# Patient Record
Sex: FEMALE | Race: WHITE
[De-identification: names, ages, dates, MRNs, and addresses within clinical notes are randomized per-mention and may not be internally consistent; named-entity substitution may affect disease eponyms.]

---

## 2017-02-26 ENCOUNTER — HOSPITAL ENCOUNTER (EMERGENCY)
Dept: HOSPITAL 59 - ER | Age: 47
Discharge: HOME | End: 2017-02-26
Payer: MEDICARE

## 2017-02-26 DIAGNOSIS — G43.109: Primary | ICD-10-CM

## 2017-02-26 PROCEDURE — 99283 EMERGENCY DEPT VISIT LOW MDM: CPT

## 2017-02-26 PROCEDURE — 96372 THER/PROPH/DIAG INJ SC/IM: CPT

## 2017-02-26 NOTE — EMERGENCY DEPARTMENT RECORD
History of Present Illness





- General


Chief Complaint: Headache Migraine


Stated Complaint: ha


Time Seen by Provider: 17 12:05


Source: Patient, RN notes reviewed


Mode of Arrival: Ambulatory





- History of Present Illness


Initial Comments: 


Patient is having her typical migraine and has nausea and she has been seen 

here for migraines before.





MD Complaint: "Migraine"


Onset/Timin


-: Days(s)


Onset Description: Gradual


Location: Diffuse, Frontal


Severity: Mild


Severity scale (1-10): 9


Quality: Aching


Consistency: Constant


Improves With: Nothing


Worsens With: Light, Noise


Treatments Prior to Arrival: Migraine medication





- Related Data


 Home Medications











 Medication  Instructions  Recorded  Confirmed  Last Taken


 


Citalopram Hydrobromide [Celexa] 20 mg PO DAILY 01/24/15 02/26/17 1 Day Ago


 


Calcium Citrate 950 mg PO DAILY 04/25/15 02/26/17 1 Day Ago


 


Cholecalciferol (Vitamin D3) 2,000 unit PO DAILY 04/25/15 02/26/17 1 Day Ago





[Vitamin D3]    


 


Cyanocobalamin (Vitamin B-12) 500 mcg SL DAILY 04/25/15 02/26/17 1 Day Ago





[B-12]    


 


Magnesium Oxide [Mag Ox] 400 mg PO DAILY 04/25/15 02/26/17 1 Day Ago


 


Tramadol HCl [Ultram] 50 mg PO Q6H 12/02/15 02/26/17 1 Day Ago


 


Oxymorphone HCl [Oxymorphone HCl 10 mg PO BID 17 2 Days Ago





ER]    


 


Pantoprazole Sodium [Protonix] 20 mg PO DAILY 17 1 Day Ago








 Previous Rx's











 Medication  Instructions  Recorded


 


Tramadol HCl 50 mg PO Q8H #10 tab 16











 Allergies











Allergy/AdvReac Type Severity Reaction Status Date / Time


 


hydromorphone [HYDROMORPHONE] Allergy Unknown DIFFICULTY Verified 17 12:01





   BREATHING  


 


morphine [MORPHINE] Allergy Unknown DIFFICULTY Verified 17 12:01





   BREATHING  


 


Penicillins [PENICILLINS] Allergy Unknown RASH Verified 17 12:01


 


codeine [CODEINE] AdvReac Unknown NAUSEA AND Verified 17 12:01





   VOMITING  


 


NSAIDS (Non-Steroidal AdvReac  VOMITING Verified 17 12:01





Anti-Inflamma     














Travel Screening





- Travel/Exposure Within Last 30 Days


Have you traveled within the last 30 days?: No





- Travel/Exposure Within Last Year


Have you traveled outside the [place] in the last year?: No





- Additonal Travel Details


Have you been exposed to anyone with a communicable illness?: No





- Travel Symptoms


Symptom Screening: None





Review of Systems


Reviewed: No additional complaints except as noted below


Constitutional: Reports: As per HPI.  Denies: Chills, Fever, Malaise, Night 

sweats, Weakness, Weight change


Eyes: Reports: As per HPI.  Denies: Eye discharge, Eye pain, Photophobia, 

Vision change


ENT: Reports: As per HPI.  Denies: Congestion, Dental pain, Ear pain, Epistaxis

, Hearing loss, Throat pain


Respiratory: Reports: As per HPI.  Denies: Cough, Dyspnea, Hemoptysis, Stridor, 

Wheezes


Cardiovascular: Reports: As per HPI.  Denies: Arrhythmia, Chest pain, Dyspnea 

on exertion, Edema, Murmurs, Orthopnea, Palpitations, Paroxysmal nocturnal 

dyspnea, Rheumatic Fever, Syncope


Endocrine: Reports: As per HPI.  Denies: Fatigue, Heat or cold intolerance, 

Polydipsia, Polyuria


Gastrointestinal: Reports: As per HPI.  Denies: Abdominal pain, Constipation, 

Diarrhea, Hematemesis, Hematochezia, Melena, Nausea, Vomiting


Genitourinary: Reports: As per HPI.  Denies: Abnormal menses, Discharge, 

Dyspareunia, Dysuria, Frequency, Hematuria, Incontinence, Retention, Urgency


Musculoskeletal: Reports: As per HPI.  Denies: Arthralgia, Back pain, Gout, 

Joint swelling, Myalgia, Neck pain


Skin: Reports: As per HPI.  Denies: Bruising, Change in color, Change in hair/

nails, Lesions, Pruritus, Rash


Neurological: Reports: As per HPI, Headache.  Denies: Abnormal gait, Confusion, 

Numbness, Paresthesias, Seizure, Tingling, Tremors, Vertigo, Weakness


Psychiatric: Reports: As per HPI.  Denies: Anxiety, Auditory hallucinations, 

Depression, Homicidal thoughts, Suicidal thoughts, Visual hallucinations


Hematological/Lymphatic: Reports: As per HPI.  Denies: Anemia, Blood Clots, 

Easy bleeding, Easy bruising, Swollen glands





Past Medical History





- SOCIAL HISTORY


Smoking Status: Former smoker


Alcohol Use: None


Drug Use: None





- RESPIRATORY


Hx Respiratory Disorders: Yes


Hx Asthma: Yes (controlled with inhalers)





- CARDIOVASCULAR


Hx Cardio Disorders: No


Hx Hypertension: No (denies)


Comment:: troubles with knee





- NEURO


Hx Neuro Disorders: Yes


Hx Headaches: Yes


Hx of Migraines: Yes (2-3x per week)


Comment:: oxycontin for relief





- GI


Hx GI Disorders: Yes


Hx Reflux: Yes (controlled with meds)


Hx Wt Loss/Wt Gain: Yes (appeitite decreased)


Comment:: gastric bypass  with 125# wt loss





- 


Hx Genitourinary Disorders: Yes


Hx Kidney Stones: Yes





- ENDOCRINE


Hx Endocrine Disorders: No


Hx Diabetes: Yes (gestational diabetes)





- MUSCULOSKELETAL


Hx Musculoskeletal Disorders: Yes


Hx Arthritis: Yes


Hx Osteoporosis: Yes





- PSYCH


Hx Psych Problems: Yes


Hx Depression: Yes





- HEMATOLOGY/ONCOLOGY


Hx Hematology/Oncology Disorders: No





Family Medical History


Any Significant Family History?: Yes


Hx Diabetes: Father, Mother


Hx Heart Disease: Father, Mother


Hx HTN: Father


Hx Kidney Disease: Father, Mother


Hx Resp Disorders: Father, Mother





Physical Exam





- General


General Appearance: Alert, Oriented x3, Cooperative, No acute distress





- Head


Head exam: Normal inspection





- Eye


Eye exam: Normal appearance, PERRL


Pupils: Normal accommodation





- ENT


ENT exam: Normal exam, Mucous membranes moist, Normal external ear exam, Normal 

orophraynx, TM's normal bilaterally


Ear exam: Normal external inspection.  negative: External canal tenderness


Nasal Exam: Normal inspection.  negative: Discharge, Sinus tenderness


Mouth exam: Normal external inspection, Tongue normal


Teeth exam: Normal inspection.  negative: Dental caries


Throat exam: Normal inspection.  negative: Tonsillar erythema, Tonsillar exudate





- Neck


Neck exam: Normal inspection, Full ROM.  negative: Tenderness





- Respiratory


Respiratory exam: Normal lung sounds bilaterally.  negative: Respiratory 

distress





- Cardiovascular


Cardiovascular Exam: Regular rate, Normal rhythm, Normal heart sounds





- GI/Abdominal


GI/Abdominal exam: Soft, Normal bowel sounds.  negative: Tenderness





- Rectal


Rectal exam: Deferred





- 


 exam: Deferred





- Extremities


Extremities exam: Normal inspection, Full ROM, Normal capillary refill.  

negative: Tenderness





- Back


Back exam: Reports: Normal inspection, Full ROM.  Denies: Muscle spasm, Rash 

noted, Tenderness





- Neurological


Neurological exam: Alert, Normal gait, Oriented X3, Reflexes normal





- Psychiatric


Psychiatric exam: Normal affect, Normal mood





- Skin


Skin exam: Dry, Intact, Normal color, Warm





Course





 Vital Signs











  17





  11:54


 


Temperature 97.9 F


 


Pulse Rate 84


 


Respiratory 16





Rate 


 


Blood Pressure 143/92


 


Pulse Ox 97














- Reevaluation(s)


Reevaluation #1: 


feeling better.





17 12:40








Disposition


Clinical Impression: 


Migraine


Qualifiers:


 Migraine type: with aura Status migrainosus presence: without status 

migrainosus Intractability: not intractable Qualified Code(s): G43.109 - 

Migraine with aura, not intractable, without status migrainosus


Disposition: Home, Self-Care


Condition: (1) Good


Instructions:  Migraine Headache (ED)


Additional Instructions: 


follow up with gonsalo Anthony in 3 days


Forms:  Patient Portal Access


Time of Disposition: 12:42

## 2017-05-07 ENCOUNTER — HOSPITAL ENCOUNTER (EMERGENCY)
Dept: HOSPITAL 59 - ER | Age: 47
Discharge: HOME | End: 2017-05-07
Payer: MEDICAID

## 2017-05-07 DIAGNOSIS — H53.149: ICD-10-CM

## 2017-05-07 DIAGNOSIS — G43.909: Primary | ICD-10-CM

## 2017-05-07 DIAGNOSIS — R11.0: ICD-10-CM

## 2017-05-07 PROCEDURE — 96372 THER/PROPH/DIAG INJ SC/IM: CPT

## 2017-05-07 PROCEDURE — 99283 EMERGENCY DEPT VISIT LOW MDM: CPT

## 2017-05-07 NOTE — EMERGENCY DEPARTMENT RECORD
History of Present Illness





- General


Chief Complaint: Headache Migraine


Stated Complaint: HA


Time Seen by Provider: 17 16:25


Source: Patient


Mode of Arrival: Ambulatory


Limitations: No limitations





- History of Present Illness


MD Complaint: "Migraine"


Onset/Timin


-: Hour(s)


Onset Description: Gradual


Location: Diffuse


Severity: Severe


Severity scale (1-10): 10


Quality: Throbbing, Similar to previous headaches


Consistency: Constant


Improves With: Nothing


Worsens With: Light, Noise


Context: Occured at rest


Associated Symptoms: Nausea, Photophobia, Sensitivity to sound


Treatments Prior to Arrival: Migraine medication





- Related Data


 Home Medications











 Medication  Instructions  Recorded  Confirmed  Last Taken


 


Citalopram Hydrobromide [Celexa] 20 mg PO DAILY 01/24/15 05/07/17 05/07/17


 


Calcium Citrate 950 mg PO DAILY 04/25/15 05/07/17 05/07/17


 


Cholecalciferol (Vitamin D3) 2,000 unit PO DAILY 04/25/15 05/07/17 05/07/17





[Vitamin D3]    


 


Cyanocobalamin (Vitamin B-12) 500 mcg SL DAILY 04/25/15 05/07/17 05/07/17





[B-12]    


 


Magnesium Oxide [Mag Ox] 400 mg PO DAILY 04/25/15 05/07/17 05/07/17


 


Tramadol HCl [Ultram] 50 mg PO Q6H 12/02/15 05/07/17 05/07/17


 


Oxymorphone HCl [Oxymorphone HCl 10 mg PO BID 17





ER]    


 


Pantoprazole Sodium [Protonix] 20 mg PO DAILY 17











 Allergies











Allergy/AdvReac Type Severity Reaction Status Date / Time


 


hydromorphone [HYDROMORPHONE] Allergy Unknown DIFFICULTY Verified 17 16:22





   BREATHING  


 


morphine [MORPHINE] Allergy Unknown DIFFICULTY Verified 17 16:22





   BREATHING  


 


Penicillins [PENICILLINS] Allergy Unknown RASH Verified 17 16:22


 


codeine [CODEINE] AdvReac Unknown NAUSEA AND Verified 17 16:22





   VOMITING  


 


NSAIDS (Non-Steroidal AdvReac  VOMITING Verified 17 16:22





Anti-Inflamma     














Travel Screening





- Travel/Exposure Within Last 30 Days


Have you traveled within the last 30 days?: No





- Travel/Exposure Within Last Year


Have you traveled outside the [place] in the last year?: No





- Additonal Travel Details


Have you been exposed to anyone with a communicable illness?: No





- Travel Symptoms


Symptom Screening: None





Review of Systems


Reviewed: No additional complaints except as noted below


Constitutional: Reports: As per HPI.  Denies: Chills, Fever, Malaise, Night 

sweats, Weakness, Weight change


Eyes: Reports: As per HPI.  Denies: Eye discharge, Eye pain, Photophobia, 

Vision change


ENT: Reports: As per HPI.  Denies: Congestion, Dental pain, Ear pain, Epistaxis

, Hearing loss, Throat pain


Respiratory: Reports: As per HPI.  Denies: Cough, Dyspnea, Hemoptysis, Stridor, 

Wheezes


Cardiovascular: Reports: As per HPI.  Denies: Arrhythmia, Chest pain, Dyspnea 

on exertion, Edema, Murmurs, Orthopnea, Palpitations, Paroxysmal nocturnal 

dyspnea, Rheumatic Fever, Syncope


Endocrine: Reports: As per HPI.  Denies: Fatigue, Heat or cold intolerance, 

Polydipsia, Polyuria


Gastrointestinal: Reports: As per HPI.  Denies: Abdominal pain, Constipation, 

Diarrhea, Hematemesis, Hematochezia, Melena, Nausea, Vomiting


Genitourinary: Reports: As per HPI.  Denies: Abnormal menses, Discharge, 

Dyspareunia, Dysuria, Frequency, Hematuria, Incontinence, Retention, Urgency


Musculoskeletal: Reports: As per HPI.  Denies: Arthralgia, Back pain, Gout, 

Joint swelling, Myalgia, Neck pain


Skin: Reports: As per HPI.  Denies: Bruising, Change in color, Change in hair/

nails, Lesions, Pruritus, Rash


Neurological: Reports: As per HPI.  Denies: Abnormal gait, Confusion, Headache, 

Numbness, Paresthesias, Seizure, Tingling, Tremors, Vertigo, Weakness


Psychiatric: Reports: As per HPI.  Denies: Anxiety, Auditory hallucinations, 

Depression, Homicidal thoughts, Suicidal thoughts, Visual hallucinations


Hematological/Lymphatic: Reports: As per HPI.  Denies: Anemia, Blood Clots, 

Easy bleeding, Easy bruising, Swollen glands





Past Medical History





- SOCIAL HISTORY


Smoking Status: Former smoker


Alcohol Use: None


Drug Use: None





- RESPIRATORY


Hx Respiratory Disorders: Yes


Hx Asthma: Yes (controlled with inhalers)





- CARDIOVASCULAR


Hx Cardio Disorders: No


Hx Hypertension: No (denies)


Comment:: troubles with knee





- NEURO


Hx Neuro Disorders: Yes


Hx Headaches: Yes


Hx of Migraines: Yes (2-3x per week)


Comment:: oxycontin for relief





- GI


Hx GI Disorders: Yes


Hx Reflux: Yes (controlled with meds)


Hx Wt Loss/Wt Gain: Yes (appeitite decreased)


Comment:: gastric bypass  with 125# wt loss





- 


Hx Genitourinary Disorders: Yes


Hx Kidney Stones: Yes





- ENDOCRINE


Hx Endocrine Disorders: No


Hx Diabetes: Yes (gestational diabetes)





- MUSCULOSKELETAL


Hx Musculoskeletal Disorders: Yes


Hx Arthritis: Yes


Hx Osteoporosis: Yes





- PSYCH


Hx Psych Problems: Yes


Hx Depression: Yes





- HEMATOLOGY/ONCOLOGY


Hx Hematology/Oncology Disorders: No





Family Medical History


Any Significant Family History?: Yes


Hx Diabetes: Father, Mother


Hx Heart Disease: Father, Mother


Hx HTN: Father


Hx Kidney Disease: Father, Mother


Hx Resp Disorders: Father, Mother





Physical Exam





- General


General Appearance: Alert, Oriented x3, Cooperative, Mild distress





- Head


Head exam: Normal inspection





- Eye


Eye exam: Normal appearance, PERRL, EOMI


Pupils: Normal accommodation





- ENT


ENT exam: Normal exam, Mucous membranes moist, Normal external ear exam, Normal 

orophraynx, TM's normal bilaterally


Ear exam: Normal external inspection.  negative: External canal tenderness


Nasal Exam: Normal inspection.  negative: Discharge, Sinus tenderness


Mouth exam: Normal external inspection, Tongue normal


Teeth exam: Normal inspection.  negative: Dental caries


Throat exam: Normal inspection.  negative: Tonsillar erythema, Tonsillar exudate





- Neck


Neck exam: Normal inspection, Full ROM.  negative: Tenderness





- Respiratory


Respiratory exam: Normal lung sounds bilaterally.  negative: Respiratory 

distress





- Cardiovascular


Cardiovascular Exam: Regular rate, Normal rhythm, Normal heart sounds





- GI/Abdominal


GI/Abdominal exam: Soft, Normal bowel sounds.  negative: Tenderness





- Rectal


Rectal exam: Deferred





- 


 exam: Deferred





- Extremities


Extremities exam: Normal inspection, Full ROM, Normal capillary refill.  

negative: Tenderness





- Back


Back exam: Reports: Normal inspection, Full ROM.  Denies: Muscle spasm, Rash 

noted, Tenderness





- Neurological


Neurological exam: Alert, CN II-XII intact, Normal gait, Oriented X3





- Psychiatric


Psychiatric exam: Normal affect, Normal mood





- Skin


Skin exam: Dry, Intact, Normal color, Warm





Course





 Vital Signs











  17





  16:10


 


Temperature 98.6 F


 


Pulse Rate 86


 


Respiratory 16





Rate 


 


Blood Pressure 134/79


 


Pulse Ox 97














Disposition


Disposition: Discharge


Clinical Impression: 


Migraine


Qualifiers:


 Migraine type: unspecified Status migrainosus presence: without status 

migrainosus Intractability: not intractable Qualified Code(s): G43.909 - 

Migraine, unspecified, not intractable, without status migrainosus





Disposition: Home, Self-Care


Condition: (1) Good


Instructions:  Migraine Headache (ED)


Additional Instructions: 


follow up with neurologist.  return sooner if worse


Forms:  Patient Portal Access

## 2017-05-25 ENCOUNTER — HOSPITAL ENCOUNTER (EMERGENCY)
Dept: HOSPITAL 59 - ER | Age: 47
Discharge: LEFT BEFORE DISCHARGE | End: 2017-05-25
Payer: MEDICARE

## 2017-05-25 DIAGNOSIS — Z53.20: Primary | ICD-10-CM

## 2017-12-08 ENCOUNTER — HOSPITAL ENCOUNTER (OUTPATIENT)
Dept: HOSPITAL 59 - SUR | Age: 47
Discharge: HOME | End: 2017-12-08
Attending: PAIN MEDICINE
Payer: MEDICARE

## 2017-12-08 DIAGNOSIS — T85.193A: Primary | ICD-10-CM

## 2017-12-08 DIAGNOSIS — M54.17: ICD-10-CM

## 2017-12-08 DIAGNOSIS — T85.192A: ICD-10-CM

## 2017-12-08 DIAGNOSIS — E66.9: ICD-10-CM

## 2017-12-08 DIAGNOSIS — Z98.84: ICD-10-CM

## 2017-12-08 DIAGNOSIS — M54.16: ICD-10-CM

## 2017-12-08 DIAGNOSIS — J45.909: ICD-10-CM

## 2017-12-08 PROCEDURE — 00300 ANES ALL PX INTEG H/N/PTRUNK: CPT

## 2017-12-08 PROCEDURE — 63688 REV/RMV IMP SP NPG/R DTCH CN: CPT

## 2017-12-08 PROCEDURE — 63661 REMOVE SPINE ELTRD PERQ ARAY: CPT

## 2017-12-08 NOTE — HISTORY AND PHYSICAL - FERRO
CHIEF COMPLAINT/HISTORY OF CHIEF COMPLAINT:  This patient with a history of an 
intractable radiculitis has a spinal cord stimulator in place since 4/06/16.  
Although this appeared to be working quite well for the patient over the last 
number of months the system has stopped covering her pain and she has stopped 
using the system.  Due to the failure of the therapy she is here by her request 
for removal of the system.  



PAST MEDICAL HISTORY:  Asthmatic bronchitis.  



PAST SURGICAL HISTORY:  Knee surgery, tonsils, ankle surgery, and gallbladder 
surgery.



MEDICATIONS ON ADMISSION:  List to be provided.  



ALLERGIES:  VICODIN.  



FAMILY/PSYCHOSOCIAL HISTORY:  Social history - Positive for caffeine.  Family 
history - Positive for asthma, diabetes, coronary artery disease, and 
hypertension.  



SYSTEMS REVIEW:  The patient seems appropriate in no acute distress.  The 
remainder of the systems review is positive for glasses, breathing difficulties
, degenerative arthritis, peripheral edema, depression, and difficulty 
sleeping.  



PHYSICAL EXAMINATION:  Height and weight are not known.  Vital signs are not 
available.  

HEENT:  Within normal limits.  

LUNGS:  Clear.

HEART:  Regular rate and rhythm.  

ABDOMEN:  Nontender.  

MUSCULOSKELETAL:  Examination of the musculoskeletal system shows the 
incisional sites of the leads approximating T12.  Generator site is noted at 
the left posterior gluteal margin.  All of the incisions are intact.  Chronic 
pain pattern low back with a bilateral lower extremity extension.  No obvious 
motor and sensory field abnormalities. 

NEUROLOGIC:  Cranial nerves are intact.  



IMPRESSION:  

1.  LUMBAR RADICULITIS, ICD-10 CODE M54.16.

2.  SPINAL CORD STIMULATOR INTERNAL GENERATOR, NONFUNCTIONAL.  



PLAN:  The patient is here for removal of the stimulator, two leads and one 
generator.  The procedure will be considered outpatient, an overnight stay 
should not be necessary.  The potential risks, side effects, and complications 
have been reviewed and discussed.  The patient understands and has consented.  





JOB NUMBER:  [id number]
MTDD

## 2017-12-09 NOTE — OPERATIVE NOTE - FERRO
DATE OF SURGERY:     12/08/17



PREOPERATIVE DIAGNOSES:   

1. INTRACTABLE LUMBAR RADICULITIS, ICD-10 CODE = M54.16 AND M54.17. 

2. SPINAL CORD STIMULATOR, INTERNAL GENERATOR, TWO LEADS.  



OPERATION:   

1. INCISION, SUBCUTANEOUS DISSECTION, AND REMOVAL OF TWO INDWELLING SPINAL CORD 
STIMULATORS.  

2. INCISION, SUBCUTANEOUS DISSECTION, AND REMOVAL OF INTERNAL PULSE GENERATOR.  



SURGEON:        JENNIFER VILLALPANDO D.O.



ANESTHESIA:     LOCAL SEDATION.



ANESTHESIA PROVIDER:     CLAUDIA HART CRNA. 



INDICATION:  This patient presents with a history of intractable lumbar 
radiculitis managed by spinal cord stimulator with two leads and internal 
generator. Over the last six to seven months, the system began to malfunction 
and lost the ability to control pain. Attempts at reprogramming were 
unsuccessful. She was given the option to remove or replace, she opted to 
remove. 



PROCEDURE:  Intravenous line, vital sign monitoring, IV sedation, prepped and 
draped sterile technique. Under imaging, the incisional site at the midline 
approximating 12-1 for the two spinal cord stimulators, infiltrated with local, 
incision made, and subcutaneous dissection was conducted to the anchors. The 
anchors and sutures were removed intact. The leads were removed intact. All 
electrodes accounted for. At the left posterior gluteal margin generator site, 
skin infiltrated, incision made, and subcutaneous dissection was conducted to 
the pouch. The pouch was opened, the generator removed, and its connections to 
the leads removed intact. Antibiotic irrigation and Bovie for hemostasis. The 
incisions were then closed Vicryl for fascia and running subcuticular Vicryl 
for skin. A Dermabond used approximate the wounds. She was transported to the 
Recovery Room stable showing no side-effects from the procedure or the 
sedation. She was monitored until stable then discharged.  



DISCHARGE INSTRUCTIONS:

1. Sites will remain clean and dry. No showering or bathing in any way that 
would disrupt dressings; if it happens, contact the clinic. 

2. Standard medications resumed including Levaquin, the antibiotic, 500 mg once 
a day for 14 days.   

3. The office will contact the patient at home to set up an evaluation in 7-10 
days to evaluate the sites. Until that period of time, activities should stay 
low. Limit bend, lift, push, pull. All other instructions provided, numbers to 
contact, problems given. The antibiotic has been called.  





cc: Dr. Vazquez 

JOB NUMBER: [id number]
[facility]

## 2018-01-03 ENCOUNTER — HOSPITAL ENCOUNTER (OUTPATIENT)
Dept: HOSPITAL 59 - SUR | Age: 48
LOS: 1 days | Discharge: HOME | End: 2018-01-04
Attending: PAIN MEDICINE
Payer: MEDICARE

## 2018-01-03 DIAGNOSIS — M54.16: Primary | ICD-10-CM

## 2018-01-03 DIAGNOSIS — J44.9: ICD-10-CM

## 2018-01-03 DIAGNOSIS — J45.909: ICD-10-CM

## 2018-01-03 DIAGNOSIS — M54.17: ICD-10-CM

## 2018-01-03 DIAGNOSIS — Z98.84: ICD-10-CM

## 2018-01-03 DIAGNOSIS — E66.9: ICD-10-CM

## 2018-01-03 PROCEDURE — 72020 X-RAY EXAM OF SPINE 1 VIEW: CPT

## 2018-01-03 RX ADMIN — SODIUM CHLORIDE, SODIUM LACTATE, POTASSIUM CHLORIDE, AND CALCIUM CHLORIDE SCH: .6; .31; .03; .02 INJECTION, SOLUTION INTRAVENOUS at 22:53

## 2018-01-03 RX ADMIN — HYDROCODONE BITARTRATE AND ACETAMINOPHEN PRN EACH: 7.5; 325 TABLET ORAL at 18:10

## 2018-01-03 RX ADMIN — CITALOPRAM HYDROBROMIDE SCH MG: 20 TABLET ORAL at 15:11

## 2018-01-03 RX ADMIN — PANTOPRAZOLE SODIUM SCH MG: 40 TABLET, DELAYED RELEASE ORAL at 21:09

## 2018-01-03 RX ADMIN — HYDROCODONE BITARTRATE AND ACETAMINOPHEN PRN EACH: 7.5; 325 TABLET ORAL at 15:09

## 2018-01-03 RX ADMIN — SODIUM CHLORIDE, SODIUM LACTATE, POTASSIUM CHLORIDE, AND CALCIUM CHLORIDE SCH: .6; .31; .03; .02 INJECTION, SOLUTION INTRAVENOUS at 21:11

## 2018-01-03 RX ADMIN — CLINDAMYCIN PHOSPHATE SCH MLS/HR: 12 INJECTION, SOLUTION INTRAVENOUS at 18:05

## 2018-01-03 RX ADMIN — HYDROCODONE BITARTRATE AND ACETAMINOPHEN PRN EACH: 7.5; 325 TABLET ORAL at 21:09

## 2018-01-03 NOTE — OPERATIVE NOTE - FERRO
DATE OF SURGERY:        01/03/18



PREOPERATIVE DIAGNOSIS:     INTRACTABLE LUMBAR RADICULITIS, ICD-10 CODE = 
M54.16 AND M54.17.



OPERATION:  

1. FLUOROSCOPICALLY-GUIDED ACCESS SPINAL SPACE AT L3-4, PLACEMENT OF THIN-
WALLED SPINAL CATHETER T12. 

2. DIAGNOSTIC MYELOGRAPHY WITH RADIOLOGIC SUPERVISION AND INTERPRETATION. 

3. SPINAL OPIOID BOLUS MORPHINE, 0.05 MG. 

4. INCISION, SUBCUTANEOUS DISSECTION, AND ANCHORING OF SPINAL CATHETER TO 
SUPRASPINOUS FASCIA WITH AN ANCHORING DEVICE AND NONABSORBABLE SUTURE. 

5. INCISION, SUBCUTANEOUS DISSECTION, AND CREATION OF A SUBCUTANEOUS POUCH AT 
LEFT POSTERIOR GLUTEAL MARGIN. 

6. TUNNELING BETWEEN POUCHES, PLACEMENT OF EXTERNAL PORTION OF SPINAL CATHETER 
INTO POSTERIOR LEFT POUCH. CATHETER REVISED INTERFACED WITH SECOND CATHETER 
COMPONENT BY WAY OF CONNECTOR. 

7. TUNNELING SECOND CATHETER COMPONENT SUPERIOR 6 CM EXITING SKIN. INTERFACE 
EXTERNAL CATHETER WITH INFUSION DEVICE SET TO DELIVER MORPHINE AT 0.2 MG PER 
DAY.  

8. CLOSURE OF MIDLINE INCISION, VICRYL FOR FASCIA, AND RUNNING SUBCUTICULAR 
VICRYL FOR SKIN. CLOSURE OF LEFT POSTERIOR POUCH INCISION WITH A RUNNING NYLON. 

9. NO EPIDURAL BLOOD PATCH COULD BE PERFORMED BECAUSE NO APPRECIABLE BLOOD FROM 
IV COULD BE OBTAINED. PATIENT TRANSPORTED TO THE RECOVERY ROOM FLAT, PILLOW 
UNDER HEAD AND KNEES. 



SURGEON:           JENNIFER VILLALPANDO D.O.



ANESTHESIA:        LOCAL SEDATION.



ANESTHESIA PROVIDER:        PARK LEAL CRNA.



INDICATION:  This patient presents with a history of intractable lumbar 
radiculitis. Due to the failure of all therapies, she is here for an implanted 
spinal catheter infusion trial with Morphine because of Hydromorphone 
sensitivities to determine if the implantation of a permanent system could be 
of any value in pain control.  



PROCEDURE:  Intravenous line, vital sign monitoring, IV sedation, prepped and 
draped in sterile technique. Patient position prone. Sterile prep. Sterile 
technique. The spinal interspace at L3-4 was marked, infiltrated, and a 20 
gauge spinal needle paramedian approach, beveled with a long axis into the 
spinal space. CSF flow was noted through the needle. A thin-walled spinal 
catheter was advanced positioned T12. CSF still noted through the spinal 
catheter. The catheter was clamped to stop CSF leak. Skin above and below the 
needle was infiltrated, incision made, and subcutaneous dissection was 
conducted to the supraspinous fascia. The needle was removed and the catheter 
was anchored to the supraspinous fascia with an anchoring device and 
nonabsorbable suture. With the catheter anchored, it was laid flat on the field 
and CSF was still noted coming from the catheter tip. Diagnostic myelography 
performed, the resulting flow characteristics were appropriate for the spinal 
space confirming myelogram characteristics. A bolus of Morphine 0.05 mg given 
into the spinal space. At the left posterior gluteal margin, site picked by the 
patient for the eventual pump, skin infiltrated, incision made, and 
subcutaneous dissection was conducted to form a pouch of suitable depth. A 
tunneling tool was used to carry the spinal catheter into the pump pouch and 
then the catheter was interfaced with a second catheter component by way of a 
catheter connection. This new catheter was then tunneled 6 cm above the 
incision at the pouch and exteriorized to the skin. The external catheter was 
then interfaced to an external pump, which was set to deliver Morphine at 0.2 
mg per day. The midline incision was closed with Vicryl for fascia and running 
subcuticular Vicryl for skin. The left posterior pouch was closed with a 
running nylon. No blood patch could be performed because the patient was a 
difficult IV and no appreciable volume of blood could be aspirated through the 
peripheral IV. At that point, dressing was placed securing the catheter and all 
connections sterile dressing. She was transported to the Recovery Room flat, 
pillow under head and knees, and will be kept flat for four hours, slowly 
elevated for one. I am recommending to the patient and family that we keep the 
patient overnight for observation. We can manage fluid volume and keep her 
appropriately positioned. 



DISCHARGE INSTRUCTIONS: 

1. The sites will remain clean and dry. No showering or bathing in any way that 
disrupts dressing. If it happens, contact the clinic. 

2. Standard medications resumed, including Levaquin, the antibiotic, 500 mg 
once a day for 14 days.

3. Spinal opioid side-effects including respiratory depression, nausea, vomiting
, constipation, urinary retention, lightheadedness, or rash have all been 
discussed and reviewed. She will be monitoring for spinal headache. With the 
occurrence of a headache, stay flat, caffeinated beverages and plenty of fluid, 
contact clinic. She will be scheduled for three increases; the first will be 
within the first 48 hours. The office will contact the patient at home to 
confirm the first increase. All other instructions provided, numbers to contact
, problems given. She will be evaluated. 





cc: Dr. Roy 

JOB NUMBER: [id number]
MTDD

## 2018-01-03 NOTE — HISTORY AND PHYSICAL REPORT
DATE:  01/02/2018.



CHIEF COMPLAINT AND HISTORY OF CHIEF COMPLAINT:  This is a patient with a 
history of intractable pain which is in the low back and legs.  She is here for 
an implanted spinal catheter infusion trial because of allergies to 
hydromorphone with morphine.  A recent removal of spinal cord stimulation has 
left this patient without any appreciable method of pain control.



PAST MEDICAL HISTORY:  Asthmatic bronchitis, intractable radiculitis.



PAST SURGICAL HISTORY:  Knee surgery, ankle surgery, gallbladder surgery.



MEDICATIONS ON ADMISSION:  To be provided.



ALLERGIES:  Vicodin.



SOCIAL HISTORY:  Caffeine.



FAMILY HISTORY:  Asthma, diabetes, coronary artery disease, hypertension.



REVIEW OF SYSTEMS:  The patient seems appropriate and in no acute distress.  
The remainder of the systems review shows glasses, difficulty breathing, 
degenerative arthritis, peripheral edema, depression, and difficulty sleeping.



PHYSICAL EXAMINATION:  

General:  Height and weight are not available.

Vital Signs:  Unavailable.

HEENT:  Within normal limits.

Lungs:  Clear.

Heart:  Regular rate and rhythm.

Abdomen:  Nontender.

Musculoskeletal:  Examination of the musculoskeletal system shows a diffuse 
pain pattern throughout the low back extending into both legs.  Motor and 
sensory field functionality is somewhat difficult to fully assess.  There is a 
pain pattern which appears to be following both an L4 and an L5 pattern into 
both legs.  

Ambulation:  No assistive device utilized.

Neurologic:  Cranial nerves are intact. 



IMPRESSION:  

LUMBAR RADICULITIS, ICD-10 CODE M54.16 AND M54.17.



PLAN:  The patient is here for an implanted spinal catheter infusion trial with 
morphine because of allergies to determine if the implantation of a permanent 
system can be of any value in pain control.  An epidural blood patch will be 
performed as a secondary measure to help prevent spinal headache.  The 
potential risks, side effects, and complications have been reviewed and 
discussed including spinal cord injury, nerve root injury, and spinal headache.
  A discussion with the [employer] representative was also reviewed, and all of 
the potential risks, side effects, and complications, as well as a review of 
the provided information through the company is documented.  The complete 
details of the procedure have been provided.  We will consider the procedure 
outpatient, although an overnight stay will be evaluated.



JOB NUMBER:  [id number]



cc:  RUTHANN Arias

## 2018-01-04 RX ADMIN — HYDROCODONE BITARTRATE AND ACETAMINOPHEN PRN EACH: 7.5; 325 TABLET ORAL at 06:26

## 2018-01-04 RX ADMIN — PANTOPRAZOLE SODIUM SCH MG: 40 TABLET, DELAYED RELEASE ORAL at 06:26

## 2018-01-04 RX ADMIN — CLINDAMYCIN PHOSPHATE SCH MLS/HR: 12 INJECTION, SOLUTION INTRAVENOUS at 01:08

## 2018-01-04 RX ADMIN — CLINDAMYCIN PHOSPHATE SCH MLS/HR: 12 INJECTION, SOLUTION INTRAVENOUS at 08:59

## 2018-01-04 RX ADMIN — HYDROCODONE BITARTRATE AND ACETAMINOPHEN PRN EACH: 7.5; 325 TABLET ORAL at 13:01

## 2018-01-04 RX ADMIN — HYDROCODONE BITARTRATE AND ACETAMINOPHEN PRN EACH: 7.5; 325 TABLET ORAL at 01:01

## 2018-01-04 RX ADMIN — CITALOPRAM HYDROBROMIDE SCH MG: 20 TABLET ORAL at 08:59

## 2018-01-04 RX ADMIN — SODIUM CHLORIDE, SODIUM LACTATE, POTASSIUM CHLORIDE, AND CALCIUM CHLORIDE SCH: .6; .31; .03; .02 INJECTION, SOLUTION INTRAVENOUS at 06:23

## 2018-01-04 RX ADMIN — HYDROCODONE BITARTRATE AND ACETAMINOPHEN PRN EACH: 7.5; 325 TABLET ORAL at 09:30

## 2018-01-04 NOTE — RADIOLOGY REPORT
EXAM:  LUMBAR SPINE



HISTORY:  STIMULATOR PLACEMENT.  



TECHNIQUE:  A single AP view of the lumbar spine was performed.  



FINDINGS:  Stimulator lead tip is at the L4 level.  



IMPRESSION:  

STIMULATOR LEAD TIP IS AT THE L4 LEVEL.  



JOB NUMBER:  [id number]
MTDD

## 2018-01-17 ENCOUNTER — HOSPITAL ENCOUNTER (OUTPATIENT)
Dept: HOSPITAL 59 - SUR | Age: 48
LOS: 1 days | Discharge: HOME | End: 2018-01-18
Attending: PAIN MEDICINE
Payer: MEDICARE

## 2018-01-17 DIAGNOSIS — M54.16: Primary | ICD-10-CM

## 2018-01-17 DIAGNOSIS — M54.17: ICD-10-CM

## 2018-01-17 PROCEDURE — 72020 X-RAY EXAM OF SPINE 1 VIEW: CPT

## 2018-01-17 PROCEDURE — 62350 IMPLANT SPINAL CANAL CATH: CPT

## 2018-01-17 PROCEDURE — 62362 IMPLANT SPINE INFUSION PUMP: CPT

## 2018-01-17 PROCEDURE — 00630 ANES PX LUMBAR REGION NOS: CPT

## 2018-01-17 PROCEDURE — 62367 ANALYZE SPINE INFUS PUMP: CPT

## 2018-01-17 RX ADMIN — PANTOPRAZOLE SODIUM SCH MG: 40 TABLET, DELAYED RELEASE ORAL at 22:06

## 2018-01-17 RX ADMIN — SODIUM CHLORIDE, SODIUM LACTATE, POTASSIUM CHLORIDE, AND CALCIUM CHLORIDE SCH MLS/HR: .6; .31; .03; .02 INJECTION, SOLUTION INTRAVENOUS at 23:29

## 2018-01-17 RX ADMIN — SODIUM CHLORIDE, SODIUM LACTATE, POTASSIUM CHLORIDE, AND CALCIUM CHLORIDE SCH MLS/HR: .6; .31; .03; .02 INJECTION, SOLUTION INTRAVENOUS at 14:39

## 2018-01-17 RX ADMIN — CLINDAMYCIN PHOSPHATE SCH MLS/HR: 12 INJECTION, SOLUTION INTRAVENOUS at 20:36

## 2018-01-17 RX ADMIN — MAGNESIUM OXIDE TAB 400 MG (241.3 MG ELEMENTAL MG) SCH MG: 400 (241.3 MG) TAB at 22:06

## 2018-01-17 RX ADMIN — CITALOPRAM HYDROBROMIDE SCH MG: 20 TABLET ORAL at 16:38

## 2018-01-17 RX ADMIN — HYDROCODONE BITARTRATE AND ACETAMINOPHEN PRN EACH: 7.5; 325 TABLET ORAL at 16:38

## 2018-01-17 RX ADMIN — HYDROCODONE BITARTRATE AND ACETAMINOPHEN PRN EACH: 7.5; 325 TABLET ORAL at 22:11

## 2018-01-17 NOTE — HISTORY AND PHYSICAL REPORT
DATE:  01/16/2018.



CHIEF COMPLAINT AND HISTORY OF CHIEF COMPLAINT:  This patient presents with a 
history of an intractable lumbar radiculitis.  On 01/03/2018 an implanted 
spinal catheter infusion trial with hydromorphone was initiated.  Her current 
spinal opioid infusion dose is 0.16 mg per day.  She has 75 to 80 percent pain 
control and wants to move forward with a permanent implant.



PAST MEDICAL HISTORY:  Asthmatic bronchitis, radiculitis.



PAST SURGICAL HISTORY:  Knee surgery, ankle surgery, gallbladder surgery.



MEDICATIONS ON ADMISSION:  To be provided.



ALLERGIES:  Vicodin.



SOCIAL HISTORY:  Caffeine.



FAMILY HISTORY:  Asthma, diabetes, coronary artery disease, hypertension.



REVIEW OF SYSTEMS:  The patient is appropriate and in no acute distress.  The 
remainder of the systems review shows glasses, difficulty with breathing, 
degenerative arthritis, peripheral edema, depression, difficulty sleeping. 



PHYSICAL EXAMINATION:  

General:  Height and weight not known.

Vital Signs:  Blood pressure 140/80.

Musculoskeletal:  Current examination shows the dressings for the implanted 
catheter trial to be intact.  The primary pain pattern is in the low back with 
a bilateral lower extremity component.  Motor and sensory field function is 
essentially intact.  There are no obvious deficits.  

Ambulation:  No assistive device utilized.

Neurologic:  Cranial nerves are intact.



IMPRESSION:  

1.  LUMBAR RADICULITIS, ICD-10 CODE M54.16 AND M54.17.

2.  IMPLANTED SPINAL CATHETER INFUSION TRIAL WITH HYDROMORPHONE.



PLAN:  This patient had greater than 75 percent pain control with the implanted 
catheter trial and wants to move forward with a permanent implant.  The 
external catheter will be removed and the pump will be implanted at the 
posterior gluteal margin as previously discussed.  The potential risks, side 
effects, and complications have all been reviewed and discussed.  The procedure 
will be considered outpatient, although an overnight stay will be evaluated.



JOB NUMBER:  [id number]



cc:  RUTHANN Arias

## 2018-01-18 RX ADMIN — CLINDAMYCIN PHOSPHATE SCH MLS/HR: 12 INJECTION, SOLUTION INTRAVENOUS at 11:49

## 2018-01-18 RX ADMIN — HYDROCODONE BITARTRATE AND ACETAMINOPHEN PRN EACH: 7.5; 325 TABLET ORAL at 11:46

## 2018-01-18 RX ADMIN — MAGNESIUM OXIDE TAB 400 MG (241.3 MG ELEMENTAL MG) SCH MG: 400 (241.3 MG) TAB at 10:02

## 2018-01-18 RX ADMIN — PANTOPRAZOLE SODIUM SCH MG: 40 TABLET, DELAYED RELEASE ORAL at 06:12

## 2018-01-18 RX ADMIN — CITALOPRAM HYDROBROMIDE SCH MG: 20 TABLET ORAL at 10:01

## 2018-01-18 RX ADMIN — CLINDAMYCIN PHOSPHATE SCH MLS/HR: 12 INJECTION, SOLUTION INTRAVENOUS at 04:36

## 2018-01-18 RX ADMIN — HYDROCODONE BITARTRATE AND ACETAMINOPHEN PRN EACH: 7.5; 325 TABLET ORAL at 06:12

## 2018-01-18 RX ADMIN — SODIUM CHLORIDE, SODIUM LACTATE, POTASSIUM CHLORIDE, AND CALCIUM CHLORIDE SCH MLS/HR: .6; .31; .03; .02 INJECTION, SOLUTION INTRAVENOUS at 08:36

## 2018-01-18 NOTE — OPERATIVE NOTE - FERRO
DATE OF SURGERY:  01/17/2018.



PREOPERATIVE DIAGNOSIS:  

1.  LUMBAR RADICULITIS, ICD-10 CODE M54.16 AND M54.17.

2.  INTRACTABLE LUMBAR RADICULITIS, ICD-10 CODE M54.16 AND M54.17.

3.  IMPLANTED SPINAL CATHETER INFUSION TRIAL WITH HYDROMORPHONE.



POSTOPERATIVE DIAGNOSIS:  

1.  LUMBAR RADICULITIS, ICD-10 CODE M54.16 AND M54.17.

2.  INTRACTABLE LUMBAR RADICULITIS, ICD-10 CODE M54.16 AND M54.17.

3.  IMPLANTED SPINAL CATHETER INFUSION TRIAL WITH HYDROMORPHONE.



OPERATION:  

1.  INCISION, SUBCUTANEOUS DISSECTION, AND REMOVAL OF EXTERNAL CATHETER.

2.  INCISION, SUBCUTANEOUS DISSECTION, AND CREATION OF SUBCUTANEOUS POUCH AT 
LEFT POSTEROSUPERIOR GLUTEAL MARGIN.  REPLACEMENT OF PUMP IDENTIFIED AS A 
MEDTRONIC 40 ML PROGRAMMABLE.

3.  REVISION OF THE INDWELLING SPINAL CATHETER INTERFACED WITH SECOND CATHETER 
COMPONENT BY WAY OF CONNECTOR.  INTERFACE REVISED CATHETER TO NEW PUMP.

4.  PUMP PREFILLED WITH MORPHINE 1.0 MG PER ML INTERFACED TO RESECTED CATHETER.
  PLACEMENT OF PUMP WITH CATHETER INTO POUCH SECURING TO POSTERIOR FASCIA WITH 
NONABSORBABLE SUTURE.

5.  PLACEMENT OF CURVED 24-GAUGE WALTON NEEDLE INTO ACCESS PORT.  PROGRAMMABLE 
PUMP ASPIRATION WITH CLEARING OF SPINAL CATHETER OF OPIOID AND CEREBROSPINAL 
FLUID MIXTURE.

6.  DIAGNOSTIC MYELOGRAPHY WITH RADIOLOGIC SUPERVISION AND INTERPRETATION.

7.  PLACEMENT OF PUMP INTO POUCH AND CLOSURE OF INCISION WITH VICRYL FOR THE 
FASCIA AND RUNNING SUBCUTICULAR VICRYL FOR THE SKIN.

8.  PROGRAMMING OF PUMP TO DELIVER BY CONTINUOUS INFUSION MORPHINE AT 1.0 MG 
PER DAY. 



SURGEON:  Carlton Cota D.O.



ANESTHESIA:  Local sedation.



ANESTHESIA PROVIDER:  Scott Olmstead CRNA.



INDICATION:  This patient presents with a history of an intractable lumbar 
radiculitis.  Due to the failure of therapy, an implanted spinal catheter 
infusion trial with morphine was conducted over 14 days.  During this period of 
time, the patient had 75 to 85 percent pain control with no side effects and 
improved functionality and mobility.  Due to the failure of all other therapies 
and the success of the implanted catheter trial, the patient presents today for 
implantation of a permanent system.



DESCRIPTION OF PROCEDURE:  Intravenous line, vital sign monitoring, and 
intravenous sedation.  The patient was positioned prone.  Sterile prep and 
sterile technique.  All of the external dressings were removed.  At the left 
posterior gluteal margin at the pump pouch site, the skin was infiltrated.  An 
incision was made and subcutaneous dissection was conducted to the interface 
between the indwelling spinal catheter and the external catheter.  The internal 
catheter was clamped.  The external catheter was cut and was then removed 
intact.  A second catheter component was then interfaced to the indwelling 
spinal catheter by way of a connector.  The revised spinal catheter would be 
interfaced to the pump.  A pouch was formed subcutaneously at the left 
posterior gluteal margin to accommodate a 40 mL programmable Medtronic pump.  
The new pump was placed onto the field filled with morphine at 1.0 mg per mL; 
40 mL total.  The revised catheter was then interfaced to the new pump.  
Antibiotic irrigation and Bovie for hemostasis.  The pump was then placed into 
the pouch which had been formed and secured to the fascia with nonabsorbable 
suture at three points using pump eyelets.  With the pump in the pouch, a 24-
gauge Walton needle was inserted into the access port, and 1.0 mL of catheter 
contents was aspirated, clearing the catheter of opioid and cerebrospinal 
mixture.  Diagnostic myelography was then performed through the access port.  
The resulting flow characteristics showed the catheter intact within the pump.  
The pump catheter connection was intact with the catheter tip in the spinal 
space at L2 with smooth linear flow of contrast noted confirming functionality 
of the system.



The incision was then closed with Vicryl for the fascia and running 
subcuticular Vicryl for the skin.  Dermabond closure.  The pump was programmed 
to deliver by continuous infusion morphine at 1.0 mg a day.  She was 
transported to the recovery room with Dermabond in place to approximate the 
edges of the wound. 



By request, she will stay overnight for observation and will be discharged in 
the morning.



DISCHARGE INSTRUCTIONS:

1.  The sites are to remain clean and dry.  No showering or bathing in any way 
tonight.  She may shower tomorrow.

2.  Standard medications are to be resumed including Levaquin the antibiotic 
500 mg once a day for 14 days.

3.  Opioid side effects including respiratory depression, nausea, vomiting, 
constipation, urinary retention, lightheadedness, and rash have all been 
discussed and reviewed.  All of the other potential side effects have been 
reviewed.

4.  She will be discharged in the morning.  She will then be contacted by the 
office in 24 to 48 hours to set up an appointment in five to seven days to 
evaluate the sites.  Until then, her activities should stay low.  No bend, lift
, push, or pull.

5.  She should not sit in water, but she may shower in 24 hours.  

6.  All other instructions were provided.  Numbers to contact with problems 
were given.  She will be discharged in the morning.



JOB NUMBER:  [id number]



cc:  RUTHANN Arias

## 2018-01-18 NOTE — RADIOLOGY REPORT
EXAM:  AP LUMBAR SPINE



HISTORY:  POST PAIN PUMP IMPLANT.  



TECHNIQUE:  A single AP view of the lumbar spine was obtained.  



Comparison:  Prior AP spine view dated 1/03/18.  



FINDINGS:  There is a battery pack partially seen along the left lower quadrant 
lateral aspect of the abdomen.  Some faint wire extends from this to overlie 
the lumbar spine at the approximate L4 level.  There is a very faint tiny dot 
like metallic density overlying the L2 vertebra which could be the superior 
extent of the associated catheter although correlation with the procedure 
itself is suggested as this is very faintly seen.  A simile dot like density 
was seen at the level of the upper body of L1 on the prior study.  Surgical 
clips right upper quadrant of the abdomen presumably from cholecystectomy.  



IMPRESSION:  

FAINT WIRE/CATHETER STRUCTURE OVERLYING THE LUMBAR SPINE QUESTIONABLY 
TERMINATING AT THE APPROXIMATE L2 LEVEL AS DESCRIBED ABOVE.  



JOB NUMBER:  [id number]
MTDD

## 2018-01-24 ENCOUNTER — HOSPITAL ENCOUNTER (EMERGENCY)
Dept: HOSPITAL 59 - ER | Age: 48
Discharge: HOME | End: 2018-01-24
Payer: MEDICARE

## 2018-01-24 DIAGNOSIS — R60.0: ICD-10-CM

## 2018-01-24 DIAGNOSIS — R11.0: Primary | ICD-10-CM

## 2018-01-24 DIAGNOSIS — R51: ICD-10-CM

## 2018-01-24 DIAGNOSIS — R42: ICD-10-CM

## 2018-01-24 LAB
ALBUMIN SERPL-MCNC: 4 G/DL (ref 4–5)
ALBUMIN/GLOB SERPL: 1 {RATIO} (ref 1.1–1.8)
ALP SERPL-CCNC: 103 U/L (ref 35–104)
ALT SERPL-CCNC: 16 U/L (ref ?–33)
ANION GAP SERPL CALC-SCNC: 11 MMOL/L (ref 7–16)
APPEARANCE UR: CLEAR
AST SERPL-CCNC: 22 U/L (ref 10–35)
BASOPHILS NFR BLD: 0.5 % (ref 0–6)
BILIRUB SERPL-MCNC: 0.2 MG/DL (ref 0.2–1)
BILIRUB UR-MCNC: NEGATIVE MG/DL
BUN SERPL-MCNC: 5 MG/DL (ref 6–20)
CO2 SERPL-SCNC: 29 MMOL/L (ref 22–29)
COLOR UR: YELLOW
CREAT SERPL-MCNC: 0.5 MG/DL (ref 0.5–0.9)
EOSINOPHIL NFR BLD: 12.4 % (ref 0–6)
ERYTHROCYTE [DISTWIDTH] IN BLOOD BY AUTOMATED COUNT: 12.6 % (ref 11.5–14.5)
EST GLOMERULAR FILTRATION RATE: > 60 ML/MIN
GLOBULIN SER-MCNC: 3.9 GM/DL (ref 1.4–4.8)
GLUCOSE SERPL-MCNC: 98 MG/DL (ref 74–109)
GLUCOSE UR STRIP-MCNC: NEGATIVE MG/DL
GRANULOCYTES NFR BLD: 48.9 % (ref 47–80)
HCT VFR BLD CALC: 37.6 % (ref 35–47)
HGB BLD-MCNC: 11.4 GM/DL (ref 11.6–16)
KETONES UR QL STRIP: NEGATIVE
LYMPHOCYTES NFR BLD AUTO: 30.5 % (ref 16–45)
MCH RBC QN AUTO: 30.8 PG (ref 27–33)
MCHC RBC AUTO-ENTMCNC: 30.3 G/DL (ref 32–36)
MCV RBC AUTO: 101.9 FL (ref 81–97)
MONOCYTES NFR BLD: 7.7 % (ref 0–9)
NITRITE UR QL STRIP: NEGATIVE
PLATELET # BLD: 357 K/UL (ref 130–400)
PMV BLD AUTO: 9.6 FL (ref 7.4–10.4)
PROT SERPL-MCNC: 7.9 G/DL (ref 6.6–8.7)
PROT UR QL STRIP: NEGATIVE
RBC # BLD AUTO: 3.69 M/UL (ref 3.8–5.4)
RBC # UR STRIP: NEGATIVE /UL
URINE LEUKOCYTE ESTERASE: NEGATIVE
UROBILINOGEN UR STRIP-ACNC: 0.2 E.U./DL (ref 0.2–1)
WBC # BLD AUTO: 5.6 K/UL (ref 4.2–12.2)

## 2018-01-24 PROCEDURE — 81003 URINALYSIS AUTO W/O SCOPE: CPT

## 2018-01-24 PROCEDURE — 80053 COMPREHEN METABOLIC PANEL: CPT

## 2018-01-24 PROCEDURE — 96365 THER/PROPH/DIAG IV INF INIT: CPT

## 2018-01-24 PROCEDURE — 85025 COMPLETE CBC W/AUTO DIFF WBC: CPT

## 2018-01-24 PROCEDURE — 99284 EMERGENCY DEPT VISIT MOD MDM: CPT

## 2018-01-24 PROCEDURE — 96375 TX/PRO/DX INJ NEW DRUG ADDON: CPT

## 2018-01-24 PROCEDURE — 83880 ASSAY OF NATRIURETIC PEPTIDE: CPT

## 2018-01-24 RX ADMIN — ONDANSETRON ONE MG: 2 INJECTION INTRAMUSCULAR; INTRAVENOUS at 15:12

## 2018-01-24 RX ADMIN — PROMETHAZINE HYDROCHLORIDE ONE MLS/HR: 25 INJECTION INTRAMUSCULAR; INTRAVENOUS at 16:50

## 2018-01-24 NOTE — EMERGENCY DEPARTMENT RECORD
History of Present Illness





- General


Chief Complaint: General


Stated Complaint: SWOLLEN FEET/LEGS,HEADACHE,DIZINESS,NAUSEA


Time Seen by Provider: 01/24/18 14:55


Source: Patient


Mode of Arrival: Ambulatory


Limitations: No limitations





- History of Present Illness


Initial comments: 





pt c/o swelling of feet and nausea  pt had a morphine pump put in last week and 

since then she has has nausea and her feet have been swelling


-: Days(s)


Location: Left, Right, Other (feet)


Quality: Aching


Consistency: Constant


Improves with: Rest


Worsens with: Movement


Associated Symptoms: Nausea/vomiting





- [place] Coma Scale


Eye Response: (4) Open spontaneously


Motor Response: (6) Obeys commands


Verbal Response: (5) Oriented


[place] Total: 15





- Related Data


 Home Medications











 Medication  Instructions  Recorded  Confirmed  Last Taken


 


Cephalexin [Keflex] 500 mg PO QID 01/24/18 01/24/18 01/24/18








 Previous Rx's











 Medication  Instructions  Recorded


 


Promethazine HCl [Phenergan] 12.5 mg PO BID #7 tablet 01/24/18











 Allergies











Allergy/AdvReac Type Severity Reaction Status Date / Time


 


hydromorphone [HYDROMORPHONE] Allergy Unknown DIFFICULTY Verified 01/24/18 14:35





   BREATHING  


 


Penicillins [PENICILLINS] Allergy Unknown RASH Verified 01/24/18 14:35


 


codeine [CODEINE] AdvReac Unknown NAUSEA AND Verified 01/24/18 14:35





   VOMITING  


 


NSAIDS (Non-Steroidal AdvReac  VOMITING Verified 01/24/18 14:35





Anti-Inflamma     














Travel Screening





- Travel/Exposure Within Last 30 Days


Have you traveled within the last 30 days?: No





- Travel/Exposure Within Last Year


Have you traveled outside the [place] in the last year?: No





- Additonal Travel Details


Have you been exposed to anyone with a communicable illness?: No





- Travel Symptoms


Symptom Screening: None





Review of Systems


Reviewed: No additional complaints except as noted below


Constitutional: Reports: As per HPI.  Denies: Chills, Fever, Malaise, Night 

sweats, Weakness, Weight change


Eyes: Reports: As per HPI.  Denies: Eye discharge, Eye pain, Photophobia, 

Vision change


ENT: Reports: As per HPI.  Denies: Congestion, Dental pain, Ear pain, Epistaxis

, Hearing loss, Throat pain


Respiratory: Reports: As per HPI.  Denies: Cough, Dyspnea, Hemoptysis, Stridor, 

Wheezes


Cardiovascular: Reports: As per HPI.  Denies: Arrhythmia, Chest pain, Dyspnea 

on exertion, Edema, Murmurs, Orthopnea, Palpitations, Paroxysmal nocturnal 

dyspnea, Rheumatic Fever, Syncope


Endocrine: Reports: As per HPI.  Denies: Fatigue, Heat or cold intolerance, 

Polydipsia, Polyuria


Gastrointestinal: Reports: As per HPI.  Denies: Abdominal pain, Constipation, 

Diarrhea, Hematemesis, Hematochezia, Melena, Nausea, Vomiting


Genitourinary: Reports: As per HPI.  Denies: Abnormal menses, Discharge, 

Dyspareunia, Dysuria, Frequency, Hematuria, Incontinence, Retention, Urgency


Musculoskeletal: Reports: As per HPI.  Denies: Arthralgia, Back pain, Gout, 

Joint swelling, Myalgia, Neck pain


Skin: Reports: As per HPI.  Denies: Bruising, Change in color, Change in hair/

nails, Lesions, Pruritus, Rash


Neurological: Reports: As per HPI.  Denies: Abnormal gait, Confusion, Headache, 

Numbness, Paresthesias, Seizure, Tingling, Tremors, Vertigo, Weakness


Psychiatric: Reports: As per HPI.  Denies: Anxiety, Auditory hallucinations, 

Depression, Homicidal thoughts, Suicidal thoughts, Visual hallucinations


Hematological/Lymphatic: Reports: As per HPI.  Denies: Anemia, Blood Clots, 

Easy bleeding, Easy bruising, Swollen glands





Past Medical History





- SOCIAL HISTORY


Smoking Status: Former smoker


Alcohol Use: None


Drug Use: None





- RESPIRATORY


Hx Respiratory Disorders: Yes


Hx Asthma: Yes (controlled with inhalers)





- CARDIOVASCULAR


Hx Cardio Disorders: No


Comment:: troubles with knee





- NEURO


Hx Neuro Disorders: Yes


Hx Dizziness: Yes (once a month vertigo down for 3-5 days)


Hx Headaches: Yes


Hx of Migraines: Yes (2-3x per week)





- GI


Hx GI Disorders: Yes


Hx Reflux: Yes (controlled with meds)


Hx Wt Loss/Wt Gain:  (denies)





- 


Hx Genitourinary Disorders: Yes


Hx Bladder Problem: Yes (small bladder frequent urination)


Hx Kidney Stones: Yes


Comment:: s/p hyst





- ENDOCRINE


Hx Endocrine Disorders: No


Hx Diabetes: Yes (gestational diabetes)





- MUSCULOSKELETAL


Hx Musculoskeletal Disorders: Yes


Hx Arthritis: Yes





- PSYCH


Hx Psych Problems: Yes


Hx Depression: Yes





- HEMATOLOGY/ONCOLOGY


Hx Hematology/Oncology Disorders: No





Family Medical History


Any Significant Family History?: Yes


Hx Diabetes: Father, Mother


Hx Heart Disease: Father, Mother


Hx HTN: Father


Hx Kidney Disease: Father, Mother


Hx Resp Disorders: Father, Mother





Physical Exam





- General


General Appearance: Alert, Oriented x3, Cooperative, Mild distress





- Head


Head exam: Normal inspection





- Eye


Eye exam: Normal appearance, PERRL, EOMI


Pupils: Normal accommodation





- ENT


ENT exam: Normal exam, Mucous membranes moist, Normal external ear exam, Normal 

orophraynx


Ear exam: Normal external inspection.  negative: External canal tenderness


Nasal Exam: Normal inspection.  negative: Discharge, Sinus tenderness


Mouth exam: Normal external inspection, Tongue normal


Teeth exam: Normal inspection.  negative: Dental caries


Throat exam: Normal inspection.  negative: Tonsillar erythema, Tonsillar exudate





- Neck


Neck exam: Normal inspection, Full ROM.  negative: Tenderness





- Respiratory


Respiratory exam: Normal lung sounds bilaterally.  negative: Respiratory 

distress





- Cardiovascular


Cardiovascular Exam: Regular rate, Normal rhythm, Normal heart sounds





- GI/Abdominal


GI/Abdominal exam: Soft, Normal bowel sounds.  negative: Tenderness





- Rectal


Rectal exam: Deferred





- 


 exam: Deferred





- Extremities


Extremities exam: Normal inspection, Full ROM, Normal capillary refill, Pedal 

edema (slight).  negative: Tenderness





- Back


Back exam: Reports: Normal inspection, Full ROM, Other (healing incision on l 

hip).  Denies: Muscle spasm, Rash noted, Tenderness





- Neurological


Neurological exam: Alert, CN II-XII intact, Normal gait, Oriented X3





- Psychiatric


Psychiatric exam: Normal affect, Normal mood





- Skin


Skin exam: Dry, Intact, Normal color, Warm





Course





 Vital Signs











  01/24/18





  14:44


 


Temperature 97.9 F


 


Pulse Rate 93 H


 


Blood Pressure 178/107














- Reevaluation(s)


Reevaluation #1: 





01/24/18 16:42


pts nausea could possibly be caused by the new morphine pump she has.  she was 

told to follow up with dr zoran mercedes this





Medical Decision Making





- Lab Data


Result diagrams: 


 01/24/18 15:20





 01/24/18 15:20





 Lab Results











  01/24/18 01/24/18 Range/Units





  15:20 15:20 


 


WBC  5.6   (4.2-12.2)  K/uL


 


RBC  3.69 L   (3.80-5.40)  M/uL


 


Hgb  11.4 L   (11.6-16.0)  gm/dl


 


Hct  37.6   (35.0-47.0)  %


 


MCV  101.9 H   (81-97)  fl


 


MCH  30.8   (27-33)  pg


 


MCHC  30.3 L   (32-36)  g/dl


 


RDW  12.6   (11.5-14.5)  %


 


Plt Count  357   (130-400)  K/uL


 


MPV  9.6   (7.4-10.4)  fl


 


Gran %  48.9   (47-80)  %


 


Lymphocytes %  30.5   (16-45)  %


 


Monocytes %  7.7   (0-9)  %


 


Eosinophils %  12.4 H   (0-6)  %


 


Basophils %  0.5   (0-6)  %


 


Sodium   138  (136-145)  mmol/L


 


Potassium   3.9  (3.4-4.5)  mmol/L


 


Chloride   98  ()  mmol/L


 


Carbon Dioxide   29.0  (22-29)  mmol/L


 


Anion Gap   11.0  (7-16)  


 


BUN   5 L  (6-20)  mg/dL


 


Creatinine   0.5  (0.5-0.9)  mg/dL


 


Estimated GFR   > 60  mL/min


 


Random Glucose   98  ()  mg/dL


 


Calcium   9.2  (8.6-10.0)  mg/dL


 


Total Bilirubin   0.20  (0.2-1.0)  mg/dL


 


Total Protein   7.9  (6.6-8.7)  g/dL














Disposition


Disposition: Discharge


Clinical Impression: 


 Nausea, Pedal edema





Disposition: Home, Self-Care


Condition: (1) Good


Instructions:  Leg Edema (ED), Acute Nausea and Vomiting (ED)


Additional Instructions: 


follow up with dr meehan and with family doctor.  return sooner if worse.  

elevate feet.


Prescriptions: 


Promethazine HCl [Phenergan] 12.5 mg PO BID #7 tablet


Forms:  Patient Portal Access





Quality





- Quality Measures


Quality Measures: N/A





- Blood Pressure Screening


Does Patient Have Any of the Following: No


Blood Pressure Classification: Hypertensive Reading


Systolic Measurement: 178


Diastolic Measurement: 107


Screening for High Blood Pressure: < First Hypertensive BP, F/U Documented > [

]


First Hypertensive Follow-up Interventions: Follow-up with rescreen GT 1 day 

and LT 4 weeks.

## 2018-01-29 ENCOUNTER — HOSPITAL ENCOUNTER (EMERGENCY)
Dept: HOSPITAL 59 - ER | Age: 48
Discharge: HOME | End: 2018-01-29
Payer: MEDICARE

## 2018-01-29 DIAGNOSIS — G89.29: Primary | ICD-10-CM

## 2018-01-29 DIAGNOSIS — R11.0: ICD-10-CM

## 2018-01-29 DIAGNOSIS — M54.2: ICD-10-CM

## 2018-01-29 PROCEDURE — 99283 EMERGENCY DEPT VISIT LOW MDM: CPT

## 2018-01-29 PROCEDURE — 96372 THER/PROPH/DIAG INJ SC/IM: CPT

## 2018-01-29 NOTE — EMERGENCY DEPARTMENT RECORD
History of Present Illness





- General


Chief Complaint: Back Pain/Injury


Stated Complaint: BACK PAIN


Time Seen by Provider: 18 07:56


Source: Patient


Mode of Arrival: Ambulatory


Limitations: No limitations





- History of Present Illness


Initial Comments: 


The patient is here due to a chronic back and neck pain issue. The patient has 

a long hx of chronic back and neck pain and did have a spinal Hydromorphone 

pump placed 12 days ago. Since the surgery she has had persistent nausea and 

now states her pain is back to her pre surgery level. She denies any CP, SOB, AP

, vomiting, diarrhea, fever, or any arm or leg numbness or weakness. The 

patient does have an appointment with her Pain Specialist today at 3:30 pm. She 

also was in the ER 5 days ago for the same issues and was placed on Phenergan 

for nausea.





MD Complaint: Back pain


Onset/Timin


-: Year(s)





- Related Data


 Previous Rx's











 Medication  Instructions  Recorded


 


Promethazine HCl [Phenergan] 12.5 mg PO BID #7 tablet 18











 Allergies











Allergy/AdvReac Type Severity Reaction Status Date / Time


 


hydromorphone [HYDROMORPHONE] Allergy Unknown DIFFICULTY Verified 18 07:58





   BREATHING  


 


Penicillins [PENICILLINS] Allergy Unknown RASH Verified 18 07:58


 


codeine [CODEINE] AdvReac Unknown NAUSEA AND Verified 18 07:58





   VOMITING  


 


NSAIDS (Non-Steroidal AdvReac  VOMITING Verified 18 07:58





Anti-Inflamma     














Review of Systems


Constitutional: Reports: Malaise.  Denies: Chills, Fever


Eyes: Denies: Eye discharge


ENT: Denies: Congestion


Respiratory: Denies: Cough, Dyspnea


Cardiovascular: Denies: Chest pain





Past Medical History





- SOCIAL HISTORY


Smoking Status: Former smoker


Drug Use: None





- RESPIRATORY


Hx Respiratory Disorders: Yes


Hx Asthma: Yes (controlled with inhalers)





- CARDIOVASCULAR


Hx Cardio Disorders: No


Comment:: troubles with knee





- NEURO


Hx Neuro Disorders: Yes


Hx Dizziness: Yes (once a month vertigo down for 3-5 days)


Hx Headaches: Yes


Hx of Migraines: Yes (2-3x per week)





- GI


Hx GI Disorders: Yes


Hx Reflux: Yes (controlled with meds)


Hx Wt Loss/Wt Gain:  (denies)





- 


Hx Genitourinary Disorders: Yes


Hx Bladder Problem: Yes (small bladder frequent urination)


Hx Kidney Stones: Yes


Comment:: s/p hyst





- ENDOCRINE


Hx Endocrine Disorders: No


Hx Diabetes: Yes (gestational diabetes)





- MUSCULOSKELETAL


Hx Musculoskeletal Disorders: Yes


Hx Arthritis: Yes





- PSYCH


Hx Psych Problems: Yes


Hx Depression: Yes





- HEMATOLOGY/ONCOLOGY


Hx Hematology/Oncology Disorders: No





Family Medical History


Hx Diabetes: Father, Mother


Hx Heart Disease: Father, Mother


Hx HTN: Father


Hx Kidney Disease: Father, Mother


Hx Resp Disorders: Father, Mother





Physical Exam





- General


General Appearance: Alert, Oriented x3, Cooperative, No acute distress





- Head


Head exam: Atraumatic, Normocephalic, Normal inspection





- Eye


Eye exam: Normal appearance, PERRL





- Neck


Neck exam: Normal inspection, Full ROM, Tenderness (There is mild diffuse 

tenderness to the posterior cervical muscle area.)





- Respiratory


Respiratory exam: Normal lung sounds bilaterally.  negative: Respiratory 

distress





- Cardiovascular


Cardiovascular Exam: Regular rate, Normal rhythm, Normal heart sounds





- GI/Abdominal


GI/Abdominal exam: Soft, Normal bowel sounds.  negative: Tenderness





- Extremities


Extremities exam: Normal inspection, Full ROM, Normal capillary refill.  

negative: Tenderness





- Neurological


Neurological exam: Alert, Normal gait, Oriented X3, Reflexes normal.  negative: 

Abnormal gait, Motor sensory deficit





- Psychiatric


Psychiatric exam: negative: Anxious





- Skin


Skin exam: negative: Erythema, Rash





Course





- Reevaluation(s)


Reevaluation #1: 


The patient is doing much better at this time. Her pain is much improved and 

she is ready for home. I did find out the pain pump presently is NOT on and 

this is clearly why her pain is worse. She also had her Tramadol stopped. She 

is encouraged to keep her appointment today with Dr. Cota to have the pump 

turned on.


18 09:06








Disposition


Disposition: Discharge


Clinical Impression: 


 Chronic pain disorder





Disposition: Home, Self-Care


Condition: (2) Stable


Instructions:  Chronic Pain (ED)


Additional Instructions: 


Please continue your regular medicines and definitely keep your appointment 

today to turn the pain pump on. Return to the ER for any worsening problems.


Forms:  Patient Portal Access


Time of Disposition: 09:05





Quality





- Quality Measures


Quality Measures: N/A





- Blood Pressure Screening


View Details: Yes


Does Patient Have Any of the Following: No, Active Dx of HTN


Blood Pressure Classification: Hypertensive Reading


Systolic Measurement: 166


Diastolic Measurement: 115


Screening for High Blood Pressure: Patient Exclusion, Hx of HTN []

## 2018-08-30 ENCOUNTER — HOSPITAL ENCOUNTER (EMERGENCY)
Dept: HOSPITAL 59 - ER | Age: 48
Discharge: HOME | End: 2018-08-30
Payer: MEDICARE
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
## 2018-08-30 DIAGNOSIS — Z87.891: ICD-10-CM

## 2018-08-30 DIAGNOSIS — S40.011A: ICD-10-CM

## 2018-08-30 DIAGNOSIS — M54.9: ICD-10-CM
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
## 2018-08-30 DIAGNOSIS — S00.93XA: Primary | ICD-10-CM

## 2018-08-30 PROCEDURE — 72125 CT NECK SPINE W/O DYE: CPT

## 2018-08-30 PROCEDURE — 99283 EMERGENCY DEPT VISIT LOW MDM: CPT

## 2018-08-30 PROCEDURE — 70450 CT HEAD/BRAIN W/O DYE: CPT

## 2018-08-30 PROCEDURE — 99284 EMERGENCY DEPT VISIT MOD MDM: CPT

## 2018-08-30 NOTE — EMERGENCY DEPARTMENT RECORD
History of Present Illness





- General


Chief Complaint: Fall Injury


Stated Complaint: FALL INJURY


Time Seen by Provider: 18 17:10


Source: Patient


Mode of Arrival: Ambulatory


Limitations: No limitations





- History of Present Illness


Initial Comments: 





46 yo female presents to ED for evaluation following a fall that occurred 

yesterday while attempting to change her litter box.  Patient reports that she 

bent over and fell to the ground resulting in injury "al over".  Patient 

specifically reports headache and right shoulder pain.  Patient denies syncope, 

denies numbness, tingling, or extremity weakness symptoms.  Patient denies the 

use of anticoagulation medications as well.  Patient reports pain with 

elevation of the right shoulder.  Patient also reports taking Tramadol for her 

pain symptoms at home that has helped.


MD Complaint: Fall


Onset/Timin


-: Hour(s)


Fall From: Standing


When Fall Occurred: Other


Fall Witnessed: Yes, by family


Place Fall Occurred: Home


Loss of Consciousness: Yes


Prolonged Down Time?: Yes


Symptoms Prior to Fall: Other


Location: Head, Back


Location - Extremities: Right: Shoulder


Severity: Moderate


Quality: Aching


Associated Symptoms: Other





- [place] Coma Scale


Eye Response: (4) Open spontaneously


Motor Response: (6) Obeys commands


Verbal Response: (5) Oriented


[place] Total: 15





- Related Data


 Previous Rx's











 Medication  Instructions  Recorded


 


Promethazine HCl [Phenergan] 12.5 mg PO BID #7 tablet 18











 Allergies











Allergy/AdvReac Type Severity Reaction Status Date / Time


 


hydromorphone [HYDROMORPHONE] Allergy Unknown DIFFICULTY Verified 18 07:58





   BREATHING  


 


Penicillins [PENICILLINS] Allergy Unknown RASH Verified 18 07:58


 


codeine [CODEINE] AdvReac Unknown NAUSEA AND Verified 18 07:58





   VOMITING  


 


NSAIDS (Non-Steroidal AdvReac  VOMITING Verified 18 07:58





Anti-Inflamma     














Travel Screening





- Travel/Exposure Within Last 30 Days


Have you traveled within the last 30 days?: No





- Travel/Exposure Within Last Year


Have you traveled outside the [place] in the last year?: No





- Additonal Travel Details


Have you been exposed to anyone with a communicable illness?: No





- Travel Symptoms


Symptom Screening: None





Review of Systems


Constitutional: Denies: Chills, Fever, Malaise, Night sweats


Eyes: Denies: Eye discharge, Eye pain


ENT: Denies: Congestion, Ear pain, Epistaxis


Respiratory: Denies: Cough, Dyspnea


Cardiovascular: Denies: Chest pain, Dyspnea on exertion


Endocrine: Denies: Fatigue, Heat or cold intolerance


Gastrointestinal: Denies: Abdominal pain, Nausea, Vomiting


Genitourinary: Denies: Incontinence, Retention


Musculoskeletal: Reports: Arthralgia.  Denies: Back pain, Gout, Joint swelling


Skin: Denies: Bruising, Change in color


Neurological: Reports: Headache.  Denies: Abnormal gait, Confusion, Tingling, 

Tremors


Psychiatric: Denies: Anxiety


Hematological/Lymphatic: Denies: Anemia, Blood Clots





Past Medical History





- SOCIAL HISTORY


Smoking Status: Former smoker


Alcohol Use: None


Drug Use: None





- RESPIRATORY


Hx Respiratory Disorders: Yes


Hx Asthma: Yes (controlled with inhalers)





- CARDIOVASCULAR


Hx Cardio Disorders: No


Comment:: troubles with knee





- NEURO


Hx Neuro Disorders: Yes


Hx Dizziness: Yes (once a month vertigo down for 3-5 days)


Hx Headaches: Yes


Hx of Migraines: Yes (2-3x per week)





- GI


Hx GI Disorders: Yes


Hx Reflux: Yes (controlled with meds)


Hx Wt Loss/Wt Gain:  (denies)





- 


Hx Genitourinary Disorders: Yes


Hx Bladder Problem: Yes (small bladder frequent urination)


Hx Kidney Stones: Yes


Comment:: s/p hyst





- ENDOCRINE


Hx Endocrine Disorders: No


Hx Diabetes: Yes (gestational diabetes)


Hx Thyroid Disease: No





- MUSCULOSKELETAL


Hx Musculoskeletal Disorders: Yes


Hx Arthritis: Yes





- PSYCH


Hx Psych Problems: Yes


Hx Depression: Yes





- HEMATOLOGY/ONCOLOGY


Hx Hematology/Oncology Disorders: No





Family Medical History


Any Significant Family History?: No


Hx Diabetes: Father, Mother


Hx Heart Disease: Father, Mother


Hx HTN: Father


Hx Kidney Disease: Father, Mother


Hx Resp Disorders: Father, Mother





Physical Exam





- General


General Appearance: Alert, Oriented x3, Cooperative, Mild distress, Anxious


Limitations: No limitations





- Head


Head exam: Atraumatic, Normocephalic, Normal inspection


Head exam detail: negative: Abrasion, Contusion, Benites's sign, General 

tenderness, Hematoma, Laceration





- Eye


Eye exam: Normal appearance.  negative: Conjunctival injection, Periorbital 

swelling, Periorbital tenderness, Scleral icterus





- ENT


Ear exam: negative: Auricular hematoma, Auricular trauma


Nasal Exam: negative: Active bleeding, Discharge, Dried blood, Sinus tenderness


Mouth exam: negative: Drooling, Laceration, Tongue elevation





- Neck


Neck exam: Normal inspection.  negative: Meningismus, Tenderness





- Respiratory


Respiratory exam: Normal lung sounds bilaterally.  negative: Respiratory 

distress, Rhonchi, Stridor, Wheezes





- Cardiovascular


Cardiovascular Exam: Regular rate, Normal rhythm, Normal heart sounds





- GI/Abdominal


GI/Abdominal exam: Soft.  negative: Distended, Rebound, Rigid, Tenderness





- Rectal


Rectal exam: Deferred





- 


 exam: Deferred





- Extremities


Extremities exam: Full ROM, Tenderness, Other (TTP over the posterior right 

shoulder with light palpation, no evidence for dislocation or AC separation on 

examination.   strength 5/5 and symmetric bilaterally.).  negative: Calf 

tenderness, Pedal edema





- Back


Back exam: Denies: CVA tenderness (R), CVA tenderness (L)





- Neurological


Neurological exam: Alert, Normal gait, Oriented X3





- Psychiatric


Psychiatric exam: Normal affect, Normal mood





- Skin


Skin exam: Normal color.  negative: Abrasion


Type of lesion: negative: abrasion





Course





 Vital Signs











  18





  17:09


 


Temperature 98.6 F


 


Pulse Rate 76


 


Respiratory 20





Rate 


 


Blood Pressure 145/106


 


Pulse Ox 99














- Reevaluation(s)


Reevaluation #1: 





18 18:22


CT Head: No acute process


CT Cervical Spine: No acute process


Right Shoulder: Negative for acute fracture





Patient was updated on all results, reports improvment in her pain symptoms and 

appears stable for discharge at this time.





Disposition


Disposition: Discharge


Clinical Impression: 


 Multiple contusions





Fall from standing


Qualifiers:


 Encounter type: initial encounter Qualified Code(s): W19.XXXA - Unspecified 

fall, initial encounter





Disposition: Home, Self-Care


Condition: (2) Stable


Instructions:  Contusion in Adults (ED)


Additional Instructions: 


Return to ED if your symptoms worsen or if you have any concerns.


Continue Tramadol as directed.


Follow-up with your family doctor in 3-5 days as directed.


Forms:  Patient Portal Access


Time of Disposition: 18:23





Quality





- Quality Measures


Quality Measures: N/A





- Blood Pressure Screening


Does Patient Have Any of the Following: Active Dx of HTN


Blood Pressure Classification: Hypertensive Reading


Systolic Measurement: 145


Diastolic Measurement: 106


Screening for High Blood Pressure: Patient Exclusion, Hx of HTN []

## 2018-09-02 NOTE — CT SCAN REPORT
EXAM:  CT SCAN CERVICAL SPINE WO CONTRAST 



HISTORY:  INJURY. 



TECHNIQUE:  Sequential axial images were obtained through the cervical spine 
without intravenous contrast administration. Sagittal and coronal reformatted 
images were performed.  



FINDINGS:  No evidence of fracture, subluxation, or perched facet. The lateral 
masses are well-aligned. The prevertebral soft tissues are normal. Visualized 
lung apices are clear. 



IMPRESSION:  NEGATIVE CT EXAMINATION OF THE CERVICAL SPINE. 



JOB NUMBER: [id number]
MTDD

## 2018-09-02 NOTE — CT SCAN REPORT
EXAM:  CT SCAN HEAD WO CONTRAST 



HISTORY:  FALL. 



TECHNIQUE:  Sequential axial images were obtained from the foramen magnum to 
the vertex without contrast administration. 



FINDINGS:  The brain volume is normal. No large territorial infarct, hemorrhage
, mass effect, or midline shift. No extra-axial fluid collection. Orbits, 
paranasal sinuses, and mastoid air cells are normal. 



IMPRESSION:  NO ACUTE INTRACRANIAL ABNORMALITY IS APPRECIATED. 



JOB NUMBER: [id number]
MTDD

## 2018-09-02 NOTE — RADIOLOGY REPORT
EXAM:  SHOULDER, RIGHT



HISTORY:  PAIN. 



TECHNIQUE: Three views of the right shoulder were performed. 



FINDINGS:  No evidence of fracture or dislocation. No lytic or blastic lesion. 



IMPRESSION:  NEGATIVE RIGHT SHOULDER EXAMINATION. 



JOB NUMBER:  [id number]
MTDD

## 2019-10-01 ENCOUNTER — HOSPITAL ENCOUNTER (EMERGENCY)
Dept: HOSPITAL 59 - ER | Age: 49
Discharge: HOME | End: 2019-10-01
Payer: COMMERCIAL

## 2019-10-01 DIAGNOSIS — G43.909: Primary | ICD-10-CM

## 2019-10-01 DIAGNOSIS — Z87.891: ICD-10-CM

## 2019-10-01 DIAGNOSIS — R11.0: ICD-10-CM

## 2019-10-01 PROCEDURE — 99283 EMERGENCY DEPT VISIT LOW MDM: CPT

## 2019-10-01 NOTE — EMERGENCY DEPARTMENT RECORD
History of Present Illness





- General


Chief Complaint: Headache Migraine


Stated Complaint: MIGRAINE


Time Seen by Provider: 10/01/19 14:07


Source: Patient


Mode of Arrival: Ambulatory


Limitations: No limitations





- History of Present Illness


Initial Comments: 





Pt presents to the ED from home with complaint of 4 day hx of "Migraine".  Pt 

has a "Lifetime history" of migraine and has them frequently.  Pt relates 

frontal HA without visual changes or photophobia.  She is nauseated but no 

vomiting.  No weakness.  Typical of prior migraines which were diagnosed by [facility] [place] 

Neurology.  She has Maxalt at home but has not used.  She has taken no meds for 

this 4 day HA.  Not the worst HA of her life. 


Onset/Timin


-: Days(s)


Onset Description: Gradual


Location: Diffuse


Severity: Moderate


Severity scale (1-10): 9


Quality: Aching, Similar to previous headaches


Consistency: Constant


Improves With: Nothing


Worsens With: Movement of head/neck, Sitting/standing


Associated Symptoms: Nausea


Treatments Prior to Arrival: None





- Related Data


                                Home Medications











 Medication  Instructions  Recorded  Confirmed  Last Taken


 


Doxepin HCl [Silenor] 6 mg PO DAILY 10/01/19 10/01/19 Unknown


 


Estradiol 1 mg PO DAILY 10/01/19 10/01/19 Unknown


 


Hyoscyamine Sulfate [Hyoscyamine 0.375 mg PO ASDIR 10/01/19 10/01/19 Unknown





Sulfate ER]    


 


Ondansetron [Zofran Odt] 4 mg PO Q8H PRN 10/01/19 10/01/19 Unknown








                                  Previous Rx's











 Medication  Instructions  Recorded


 


Promethazine HCl [Phenergan] 12.5 mg PO BID #7 tablet 18


 


Ondansetron HCl [Zofran] 4 mg PO Q6HR PRN 6 Days #8 tablet 10/01/19


 


Rizatriptan Benzoate [Maxalt] 10 mg PO DAILY 6 Days #6 tablet 10/01/19











                                    Allergies











Allergy/AdvReac Type Severity Reaction Status Date / Time


 


hydromorphone [HYDROMORPHONE] Allergy Unknown DIFFICULTY Verified 18 07:58





   BREATHING  


 


Penicillins [PENICILLINS] Allergy Unknown RASH Verified 18 07:58


 


codeine [CODEINE] AdvReac Unknown NAUSEA AND Verified 18 07:58





   VOMITING  


 


NSAIDS (Non-Steroidal AdvReac  VOMITING Verified 18 07:58





Anti-Inflamma     














Travel Screening





- Travel/Exposure Within Last 30 Days


Have you traveled within the last 30 days?: No





Review of Systems


Constitutional: Denies: Chills, Fever, Weakness


Eyes: Denies: Eye discharge, Eye pain, Photophobia, Vision change


ENT: Denies: Congestion, Dental pain


Respiratory: Denies: Cough


Cardiovascular: Denies: Chest pain, Palpitations, Syncope


Endocrine: Denies: Fatigue


Gastrointestinal: Reports: Nausea.  Denies: Abdominal pain, Diarrhea, Vomiting


Musculoskeletal: Denies: Arthralgia, Back pain


Skin: Denies: Bruising, Rash


Neurological: Reports: As per HPI, Headache.  Denies: Tingling, Tremors, 

Weakness


Psychiatric: Denies: Anxiety, Suicidal thoughts


Hematological/Lymphatic: Denies: Anemia





Past Medical History





- SOCIAL HISTORY


Smoking Status: Former smoker


Alcohol Use: None


Drug Use: None





- RESPIRATORY


Hx Respiratory Disorders: Yes


Hx Asthma: Yes (controlled with inhalers)





- CARDIOVASCULAR


Hx Cardio Disorders: No


Comment:: troubles with knee





- NEURO


Hx Neuro Disorders: Yes


Hx Dizziness: Yes (once a month vertigo down for 3-5 days)


Hx Headaches: Yes


Hx of Migraines: No (2-3x per week)





- GI


Hx GI Disorders: Yes


Hx Reflux: Yes (controlled with meds)


Hx Wt Loss/Wt Gain:  (denies)





- 


Hx Genitourinary Disorders: Yes


Hx Bladder Problem: Yes (small bladder frequent urination)


Hx Kidney Stones: Yes


Comment:: s/p hyst





- ENDOCRINE


Hx Endocrine Disorders: No


Hx Diabetes: Yes (gestational diabetes)


Hx Thyroid Disease: No





- MUSCULOSKELETAL


Hx Musculoskeletal Disorders: Yes


Hx Arthritis: Yes





- PSYCH


Hx Psych Problems: Yes


Hx Depression: Yes





- HEMATOLOGY/ONCOLOGY


Hx Hematology/Oncology Disorders: No





Family Medical History


Any Significant Family History?: Yes


Hx Diabetes: Father, Mother


Hx Heart Disease: Father, Mother


Hx HTN: Father


Hx Kidney Disease: Father, Mother


Hx Resp Disorders: Father, Mother





Physical Exam





- General


General Appearance: Alert, Oriented x3, Cooperative, No acute distress





- Head


Head exam: Atraumatic, Normal inspection





- Eye


Eye exam: Normal appearance, PERRL, EOMI.  negative: Nystagmus





- ENT


ENT exam: Normal exam, Mucous membranes moist, Normal external ear exam, Normal 

orophraynx, TM's normal bilaterally





- Neck


Neck exam: Normal inspection, Full ROM.  negative: Lymphadenopathy, Tenderness





- Respiratory


Respiratory exam: Normal lung sounds bilaterally.  negative: Respiratory 

distress, Rhonchi, Wheezes





- Cardiovascular


Cardiovascular Exam: Regular rate, Normal rhythm, Normal heart sounds.  

negative: Tachycardia


Peripheral Pulses: 2+: Radial (R), Radial (L)





- GI/Abdominal


GI/Abdominal exam: Soft, Normal bowel sounds.  negative: Tenderness





- Extremities


Extremities exam: Normal inspection, Full ROM, Normal capillary refill.  

negative: Tenderness





- Back


Back exam: Reports: Normal inspection, Full ROM.  Denies: Muscle spasm, Rash 

noted, Tenderness





- Neurological


Neurological exam: Alert, CN II-XII intact, Normal gait, Oriented X3, Reflexes 

normal





- Psychiatric


Psychiatric exam: Anxious, Normal affect, Normal mood





- Skin


Skin exam: Normal color.  negative: Rash





Course





                                   Vital Signs











  10/01/19





  13:53


 


Temperature 98.9 F


 


Pulse Rate 85


 


Respiratory 18





Rate 


 


Blood Pressure 150/104


 


Pulse Ox 98














- Reevaluation(s)


Reevaluation #1: 





10/01/19 14:19


Has taken no meds at home prior to coming to the ED with her daughter who has 

the same complaint of Migraine and is a patient in the room.  Will try po meds 

to see if relief.  Pt agrees. 


Reevaluation #2: 





10/01/19 15:38


po meds with little relief.  Plan is home with meds as outlined and follow with 

PMD. 





Disposition


Disposition: Discharge


Clinical Impression: 


 Migraine





Disposition: Home, Self-Care


Condition: (2) Stable


Instructions:  Migraine Headache (ED)


Additional Instructions: 


See your family doctor in 1-2 days


return to the ED as needed. 


Prescriptions: 


Ondansetron HCl [Zofran] 4 mg PO Q6HR PRN 6 Days #8 tablet


 PRN Reason: Nausea


Rizatriptan Benzoate [Maxalt] 10 mg PO DAILY 6 Days #6 tablet


Forms:  Patient Portal Access


Time of Disposition: 15:41





Quality





- Quality Measures


Quality Measures: Headache (All Ages)





- Headache: Neuroimaging


Quality Measure: Measure #419: Overuse of Neuroimaging


ICD10 Codes Entered: Yes


Neurological Exam: Patient had a normal neurological exam. []


Headache: Use of Neuroimaging: < CTA, CT, MRA or MRI was NOT ordered > []





- Blood Pressure Screening


Does Patient Have Any of the Following: No


Blood Pressure Classification: Hypertensive Reading


Systolic Measurement: 150


Diastolic Measurement: 104


Screening for High Blood Pressure: < Pre-Hypertensive BP, F/U Documented > 

[]


Pre-Hypertensive Follow-up Interventions: Follow-up with rescreen every year.